# Patient Record
Sex: FEMALE | Race: WHITE | Employment: OTHER | URBAN - METROPOLITAN AREA
[De-identification: names, ages, dates, MRNs, and addresses within clinical notes are randomized per-mention and may not be internally consistent; named-entity substitution may affect disease eponyms.]

---

## 2017-02-21 PROCEDURE — 87624 HPV HI-RISK TYP POOLED RSLT: CPT | Performed by: OBSTETRICS & GYNECOLOGY

## 2017-02-21 PROCEDURE — 88175 CYTOPATH C/V AUTO FLUID REDO: CPT | Performed by: OBSTETRICS & GYNECOLOGY

## 2017-02-26 ENCOUNTER — HOSPITAL ENCOUNTER (EMERGENCY)
Facility: HOSPITAL | Age: 47
Discharge: HOME | End: 2017-02-26
Attending: EMERGENCY MEDICINE
Payer: COMMERCIAL

## 2017-02-26 VITALS
RESPIRATION RATE: 18 BRPM | HEART RATE: 98 BPM | TEMPERATURE: 97 F | OXYGEN SATURATION: 97 % | SYSTOLIC BLOOD PRESSURE: 100 MMHG | WEIGHT: 180 LBS | BODY MASS INDEX: 28.93 KG/M2 | HEIGHT: 66 IN | DIASTOLIC BLOOD PRESSURE: 69 MMHG

## 2017-02-26 DIAGNOSIS — N39.0 URINARY TRACT INFECTION, SITE UNSPECIFIED: ICD-10-CM

## 2017-02-26 DIAGNOSIS — R25.1 EPISODE OF SHAKING: Primary | ICD-10-CM

## 2017-02-26 LAB
ALBUMIN SERPL-MCNC: 4.2 G/DL (ref 3.5–4.8)
ALP LIVER SERPL-CCNC: 81 U/L (ref 37–98)
ALT SERPL-CCNC: 36 U/L (ref 14–54)
AST SERPL-CCNC: 17 U/L (ref 15–41)
ATRIAL RATE: 73 BPM
BASOPHILS # BLD AUTO: 0.02 X10(3) UL (ref 0–0.1)
BASOPHILS NFR BLD AUTO: 0.2 %
BILIRUB SERPL-MCNC: 0.5 MG/DL (ref 0.1–2)
BILIRUB UR QL STRIP.AUTO: NEGATIVE
BUN BLD-MCNC: 13 MG/DL (ref 8–20)
CALCIUM BLD-MCNC: 9.4 MG/DL (ref 8.3–10.3)
CHLORIDE: 112 MMOL/L (ref 101–111)
CO2: 22 MMOL/L (ref 22–32)
COLOR UR AUTO: YELLOW
CREAT BLD-MCNC: 1.18 MG/DL (ref 0.55–1.02)
EOSINOPHIL # BLD AUTO: 0.2 X10(3) UL (ref 0–0.3)
EOSINOPHIL NFR BLD AUTO: 2.4 %
ERYTHROCYTE [DISTWIDTH] IN BLOOD BY AUTOMATED COUNT: 13.2 % (ref 11.5–16)
GLUCOSE BLD-MCNC: 107 MG/DL (ref 70–99)
GLUCOSE UR STRIP.AUTO-MCNC: NEGATIVE MG/DL
HAV IGM SER QL: 2.1 MG/DL (ref 1.7–3)
HCT VFR BLD AUTO: 42.2 % (ref 34–50)
HGB BLD-MCNC: 14 G/DL (ref 12–16)
HYALINE CASTS #/AREA URNS AUTO: PRESENT /LPF
IMMATURE GRANULOCYTE COUNT: 0.03 X10(3) UL (ref 0–1)
IMMATURE GRANULOCYTE RATIO %: 0.4 %
KETONES UR STRIP.AUTO-MCNC: NEGATIVE MG/DL
LYMPHOCYTES # BLD AUTO: 3.09 X10(3) UL (ref 0.9–4)
LYMPHOCYTES NFR BLD AUTO: 37.1 %
M PROTEIN MFR SERPL ELPH: 7.9 G/DL (ref 6.1–8.3)
MCH RBC QN AUTO: 29.9 PG (ref 27–33.2)
MCHC RBC AUTO-ENTMCNC: 33.2 G/DL (ref 31–37)
MCV RBC AUTO: 90.2 FL (ref 81–100)
MONOCYTES # BLD AUTO: 0.52 X10(3) UL (ref 0.1–0.6)
MONOCYTES NFR BLD AUTO: 6.3 %
NEUTROPHIL ABS PRELIM: 4.46 X10 (3) UL (ref 1.3–6.7)
NEUTROPHILS # BLD AUTO: 4.46 X10(3) UL (ref 1.3–6.7)
NEUTROPHILS NFR BLD AUTO: 53.6 %
NITRITE UR QL STRIP.AUTO: NEGATIVE
P AXIS: 29 DEGREES
P-R INTERVAL: 160 MS
PH UR STRIP.AUTO: 6 [PH] (ref 4.5–8)
PLATELET # BLD AUTO: 256 10(3)UL (ref 150–450)
POTASSIUM SERPL-SCNC: 3.9 MMOL/L (ref 3.6–5.1)
PROT UR STRIP.AUTO-MCNC: NEGATIVE MG/DL
Q-T INTERVAL: 348 MS
QRS DURATION: 82 MS
QTC CALCULATION (BEZET): 383 MS
R AXIS: 10 DEGREES
RBC # BLD AUTO: 4.68 X10(6)UL (ref 3.8–5.1)
RBC UR QL AUTO: NEGATIVE
RED CELL DISTRIBUTION WIDTH-SD: 43.1 FL (ref 35.1–46.3)
SODIUM SERPL-SCNC: 144 MMOL/L (ref 136–144)
SP GR UR STRIP.AUTO: 1.01 (ref 1–1.03)
T AXIS: 35 DEGREES
UROBILINOGEN UR STRIP.AUTO-MCNC: <2 MG/DL
VENTRICULAR RATE: 73 BPM
WBC # BLD AUTO: 8.3 X10(3) UL (ref 4–13)

## 2017-02-26 PROCEDURE — 96374 THER/PROPH/DIAG INJ IV PUSH: CPT

## 2017-02-26 PROCEDURE — 93010 ELECTROCARDIOGRAM REPORT: CPT

## 2017-02-26 PROCEDURE — 87077 CULTURE AEROBIC IDENTIFY: CPT | Performed by: EMERGENCY MEDICINE

## 2017-02-26 PROCEDURE — 83735 ASSAY OF MAGNESIUM: CPT | Performed by: EMERGENCY MEDICINE

## 2017-02-26 PROCEDURE — 96375 TX/PRO/DX INJ NEW DRUG ADDON: CPT

## 2017-02-26 PROCEDURE — 93005 ELECTROCARDIOGRAM TRACING: CPT

## 2017-02-26 PROCEDURE — 99284 EMERGENCY DEPT VISIT MOD MDM: CPT

## 2017-02-26 PROCEDURE — 81001 URINALYSIS AUTO W/SCOPE: CPT | Performed by: EMERGENCY MEDICINE

## 2017-02-26 PROCEDURE — 99285 EMERGENCY DEPT VISIT HI MDM: CPT

## 2017-02-26 PROCEDURE — 80053 COMPREHEN METABOLIC PANEL: CPT | Performed by: EMERGENCY MEDICINE

## 2017-02-26 PROCEDURE — 85025 COMPLETE CBC W/AUTO DIFF WBC: CPT | Performed by: EMERGENCY MEDICINE

## 2017-02-26 PROCEDURE — 87086 URINE CULTURE/COLONY COUNT: CPT | Performed by: EMERGENCY MEDICINE

## 2017-02-26 RX ORDER — METOCLOPRAMIDE HYDROCHLORIDE 5 MG/ML
10 INJECTION INTRAMUSCULAR; INTRAVENOUS ONCE
Status: COMPLETED | OUTPATIENT
Start: 2017-02-26 | End: 2017-02-26

## 2017-02-26 RX ORDER — CIPROFLOXACIN 500 MG/1
500 TABLET, FILM COATED ORAL 2 TIMES DAILY
Qty: 6 TABLET | Refills: 0 | Status: SHIPPED | OUTPATIENT
Start: 2017-02-26 | End: 2017-03-01

## 2017-02-26 RX ORDER — IBUPROFEN 600 MG/1
600 TABLET ORAL ONCE
Status: COMPLETED | OUTPATIENT
Start: 2017-02-26 | End: 2017-02-26

## 2017-02-26 RX ORDER — LORAZEPAM 2 MG/ML
1 INJECTION INTRAMUSCULAR ONCE
Status: COMPLETED | OUTPATIENT
Start: 2017-02-26 | End: 2017-02-26

## 2017-02-26 RX ORDER — LORAZEPAM 2 MG/ML
1 INJECTION INTRAMUSCULAR ONCE
Status: DISCONTINUED | OUTPATIENT
Start: 2017-02-26 | End: 2017-02-26

## 2017-02-26 RX ORDER — DIPHENHYDRAMINE HYDROCHLORIDE 50 MG/ML
25 INJECTION INTRAMUSCULAR; INTRAVENOUS ONCE
Status: COMPLETED | OUTPATIENT
Start: 2017-02-26 | End: 2017-02-26

## 2017-02-26 NOTE — ED NOTES
Pt states headache is better. Resting comfortably on cart. Discharge instructions reviewed with pt and spouse. Prescription for antibiotic given to pt's . IV removed. No questions at time of discharge.

## 2017-02-26 NOTE — ED PROVIDER NOTES
Patient Seen in: [facility] Emergency Department    History   Patient presents with:  Seizure Disorder (neurologic)  Dyspnea JOSSELYN SOB (respiratory)    Stated Complaint: non-epileptic seizures and shortness of breath since this morning    HPI    Patie 5/26/2015    Comment Procedure: LITHOTRIPSY WITH CYSTOSCOPY, STENT PLACEMENT;  Surgeon: Lauro Martinez DO;  Location: [address] [place] [address]    [facility]    Comment Procedure: Uzmann Card;  Surgeon: Lauro Martinez DO (1080 MG) Oral Tab CR,     OMEPRAZOLE 40 MG Oral Capsule Delayed Release,  TAKE ONE CAPSULE BY MOUTH EVERY DAY   DULoxetine HCl (CYMBALTA) 60 MG Oral Cap DR Particles,  Take 1 capsule (60 mg total) by mouth daily.    ClonazePAM 1 MG Oral Tab,  Take 1 tablet HPI.  Constitutional and vital signs reviewed. All other systems reviewed and negative except as noted above. PSFH elements reviewed from today and agreed except as otherwise stated in HPI.     Physical Exam       ED Triage Vitals   BP 02/26/17 1010 REFLEX - Abnormal; Notable for the following:     Clarity Urine Hazy (*)     Leukocyte Esterase Urine Large (*)     WBC Urine 5-10 (*)     RBC URINE 3-5 (*)     Bacteria Urine Rare (*)     Squamous Epi.  Cells Large (*)     Hyaline Casts Present (*)     Muc physical exam at this time, I do not feel that admission or further ED testing is needed. Patient and family verbalized understanding and are comfortable with the plan as noted. Patient was subsequently discharged without incident.     Disposition and P

## 2017-02-26 NOTE — ED NOTES
Pt c/o HA. md aware. MD request urine sample. Pt notified. Pt request to walk to restroom. Morrow steady. Walking with nurse.

## 2017-03-01 PROBLEM — F44.5 PSYCHOGENIC NONEPILEPTIC SEIZURE: Status: ACTIVE | Noted: 2017-03-01

## 2017-03-07 PROCEDURE — 88305 TISSUE EXAM BY PATHOLOGIST: CPT | Performed by: OBSTETRICS & GYNECOLOGY

## 2017-03-16 PROBLEM — M62.89 PELVIC FLOOR DYSFUNCTION: Status: ACTIVE | Noted: 2017-03-16

## 2017-03-16 PROBLEM — N94.10 DYSPAREUNIA IN FEMALE: Status: ACTIVE | Noted: 2017-03-16

## 2017-03-16 PROBLEM — N39.3 STRESS INCONTINENCE: Status: ACTIVE | Noted: 2017-03-16

## 2017-03-16 PROBLEM — N39.0 RECURRENT UTI: Status: ACTIVE | Noted: 2017-03-16

## 2017-03-21 ENCOUNTER — ANESTHESIA EVENT (OUTPATIENT)
Dept: SURGERY | Facility: HOSPITAL | Age: 47
End: 2017-03-21
Payer: COMMERCIAL

## 2017-04-04 ENCOUNTER — ANESTHESIA (OUTPATIENT)
Dept: SURGERY | Facility: HOSPITAL | Age: 47
End: 2017-04-04
Payer: COMMERCIAL

## 2017-04-04 ENCOUNTER — SURGERY (OUTPATIENT)
Age: 47
End: 2017-04-04

## 2017-04-04 ENCOUNTER — HOSPITAL ENCOUNTER (OUTPATIENT)
Facility: HOSPITAL | Age: 47
Setting detail: OBSERVATION
Discharge: HOME | End: 2017-04-05
Attending: OBSTETRICS & GYNECOLOGY | Admitting: OBSTETRICS & GYNECOLOGY
Payer: COMMERCIAL

## 2017-04-04 PROBLEM — Z98.890 POST-OPERATIVE STATE: Status: ACTIVE | Noted: 2017-04-04

## 2017-04-04 LAB
ANTIBODY SCREEN: NEGATIVE
HGB BLD-MCNC: 13.5 G/DL (ref 12–16)
RH BLOOD TYPE: NEGATIVE

## 2017-04-04 PROCEDURE — 86850 RBC ANTIBODY SCREEN: CPT | Performed by: OBSTETRICS & GYNECOLOGY

## 2017-04-04 PROCEDURE — 88307 TISSUE EXAM BY PATHOLOGIST: CPT | Performed by: OBSTETRICS & GYNECOLOGY

## 2017-04-04 PROCEDURE — 8E0W4CZ ROBOTIC ASSISTED PROCEDURE OF TRUNK REGION, PERCUTANEOUS ENDOSCOPIC APPROACH: ICD-10-PCS | Performed by: OBSTETRICS & GYNECOLOGY

## 2017-04-04 PROCEDURE — 0TN64ZZ RELEASE RIGHT URETER, PERCUTANEOUS ENDOSCOPIC APPROACH: ICD-10-PCS | Performed by: OBSTETRICS & GYNECOLOGY

## 2017-04-04 PROCEDURE — 0UTC4ZZ RESECTION OF CERVIX, PERCUTANEOUS ENDOSCOPIC APPROACH: ICD-10-PCS | Performed by: OBSTETRICS & GYNECOLOGY

## 2017-04-04 PROCEDURE — 0TN74ZZ RELEASE LEFT URETER, PERCUTANEOUS ENDOSCOPIC APPROACH: ICD-10-PCS | Performed by: OBSTETRICS & GYNECOLOGY

## 2017-04-04 PROCEDURE — 85018 HEMOGLOBIN: CPT | Performed by: ANESTHESIOLOGY

## 2017-04-04 PROCEDURE — 86900 BLOOD TYPING SEROLOGIC ABO: CPT | Performed by: OBSTETRICS & GYNECOLOGY

## 2017-04-04 PROCEDURE — 86901 BLOOD TYPING SEROLOGIC RH(D): CPT | Performed by: OBSTETRICS & GYNECOLOGY

## 2017-04-04 RX ORDER — TOPIRAMATE 100 MG/1
100 TABLET, FILM COATED ORAL 2 TIMES DAILY
Status: DISCONTINUED | OUTPATIENT
Start: 2017-04-04 | End: 2017-04-05

## 2017-04-04 RX ORDER — PRAVASTATIN SODIUM 20 MG
20 TABLET ORAL NIGHTLY
Status: DISCONTINUED | OUTPATIENT
Start: 2017-04-04 | End: 2017-04-05

## 2017-04-04 RX ORDER — KETOROLAC TROMETHAMINE 30 MG/ML
30 INJECTION, SOLUTION INTRAMUSCULAR; INTRAVENOUS EVERY 6 HOURS
Status: DISCONTINUED | OUTPATIENT
Start: 2017-04-04 | End: 2017-04-05

## 2017-04-04 RX ORDER — CLONAZEPAM 0.5 MG/1
0.5 TABLET ORAL DAILY PRN
Status: DISCONTINUED | OUTPATIENT
Start: 2017-04-04 | End: 2017-04-05

## 2017-04-04 RX ORDER — SODIUM CHLORIDE, SODIUM LACTATE, POTASSIUM CHLORIDE, CALCIUM CHLORIDE 600; 310; 30; 20 MG/100ML; MG/100ML; MG/100ML; MG/100ML
INJECTION, SOLUTION INTRAVENOUS CONTINUOUS
Status: DISCONTINUED | OUTPATIENT
Start: 2017-04-04 | End: 2017-04-05

## 2017-04-04 RX ORDER — NALOXONE HYDROCHLORIDE 0.4 MG/ML
80 INJECTION, SOLUTION INTRAMUSCULAR; INTRAVENOUS; SUBCUTANEOUS AS NEEDED
Status: DISCONTINUED | OUTPATIENT
Start: 2017-04-04 | End: 2017-04-04 | Stop reason: HOSPADM

## 2017-04-04 RX ORDER — LEVOTHYROXINE SODIUM 0.05 MG/1
50 TABLET ORAL
COMMUNITY
End: 2017-10-17

## 2017-04-04 RX ORDER — HYDROMORPHONE HYDROCHLORIDE 1 MG/ML
0.4 INJECTION, SOLUTION INTRAMUSCULAR; INTRAVENOUS; SUBCUTANEOUS EVERY 5 MIN PRN
Status: DISCONTINUED | OUTPATIENT
Start: 2017-04-04 | End: 2017-04-04 | Stop reason: HOSPADM

## 2017-04-04 RX ORDER — TOPIRAMATE 100 MG/1
100 TABLET, FILM COATED ORAL 2 TIMES DAILY
COMMUNITY
End: 2017-06-20

## 2017-04-04 RX ORDER — HYDROCODONE BITARTRATE AND ACETAMINOPHEN 5; 325 MG/1; MG/1
2 TABLET ORAL EVERY 4 HOURS PRN
Status: DISCONTINUED | OUTPATIENT
Start: 2017-04-04 | End: 2017-04-05

## 2017-04-04 RX ORDER — ENOXAPARIN SODIUM 100 MG/ML
40 INJECTION SUBCUTANEOUS NIGHTLY
Status: DISCONTINUED | OUTPATIENT
Start: 2017-04-04 | End: 2017-04-05

## 2017-04-04 RX ORDER — HEPARIN SODIUM 5000 [USP'U]/ML
5000 INJECTION, SOLUTION INTRAVENOUS; SUBCUTANEOUS ONCE
Status: COMPLETED | OUTPATIENT
Start: 2017-04-04 | End: 2017-04-04

## 2017-04-04 RX ORDER — HYDROMORPHONE HYDROCHLORIDE 1 MG/ML
INJECTION, SOLUTION INTRAMUSCULAR; INTRAVENOUS; SUBCUTANEOUS
Status: COMPLETED
Start: 2017-04-04 | End: 2017-04-04

## 2017-04-04 RX ORDER — CLINDAMYCIN PHOSPHATE 900 MG/50ML
900 INJECTION INTRAVENOUS EVERY 8 HOURS
Status: COMPLETED | OUTPATIENT
Start: 2017-04-04 | End: 2017-04-05

## 2017-04-04 RX ORDER — MEPERIDINE HYDROCHLORIDE 25 MG/ML
12.5 INJECTION INTRAMUSCULAR; INTRAVENOUS; SUBCUTANEOUS AS NEEDED
Status: DISCONTINUED | OUTPATIENT
Start: 2017-04-04 | End: 2017-04-04 | Stop reason: HOSPADM

## 2017-04-04 RX ORDER — PROCHLORPERAZINE 25 MG
25 SUPPOSITORY, RECTAL RECTAL EVERY 12 HOURS PRN
Status: DISCONTINUED | OUTPATIENT
Start: 2017-04-04 | End: 2017-04-05

## 2017-04-04 RX ORDER — HYDROCODONE BITARTRATE AND ACETAMINOPHEN 5; 325 MG/1; MG/1
1 TABLET ORAL EVERY 4 HOURS PRN
Status: DISCONTINUED | OUTPATIENT
Start: 2017-04-04 | End: 2017-04-05

## 2017-04-04 RX ORDER — ONDANSETRON 4 MG/1
4 TABLET, FILM COATED ORAL EVERY 8 HOURS PRN
Status: DISCONTINUED | OUTPATIENT
Start: 2017-04-04 | End: 2017-04-05

## 2017-04-04 RX ORDER — ACETAMINOPHEN 325 MG/1
650 TABLET ORAL EVERY 4 HOURS PRN
Status: DISCONTINUED | OUTPATIENT
Start: 2017-04-04 | End: 2017-04-05

## 2017-04-04 RX ORDER — CLINDAMYCIN PHOSPHATE 900 MG/50ML
INJECTION INTRAVENOUS
Status: DISCONTINUED | OUTPATIENT
Start: 2017-04-04 | End: 2017-04-04

## 2017-04-04 RX ORDER — PROCHLORPERAZINE MALEATE 10 MG
10 TABLET ORAL EVERY 12 HOURS PRN
Status: DISCONTINUED | OUTPATIENT
Start: 2017-04-04 | End: 2017-04-05

## 2017-04-04 RX ORDER — GENTAMICIN SULFATE 60 MG/50ML
INJECTION, SOLUTION INTRAVENOUS
Status: DISCONTINUED | OUTPATIENT
Start: 2017-04-04 | End: 2017-04-04

## 2017-04-04 RX ORDER — LEVOTHYROXINE SODIUM 0.05 MG/1
50 TABLET ORAL
Status: DISCONTINUED | OUTPATIENT
Start: 2017-04-05 | End: 2017-04-05

## 2017-04-04 RX ORDER — ONDANSETRON 2 MG/ML
4 INJECTION INTRAMUSCULAR; INTRAVENOUS EVERY 8 HOURS PRN
Status: DISCONTINUED | OUTPATIENT
Start: 2017-04-04 | End: 2017-04-05

## 2017-04-04 RX ORDER — PRAVASTATIN SODIUM 20 MG
20 TABLET ORAL NIGHTLY
COMMUNITY
End: 2017-06-30

## 2017-04-04 RX ORDER — LORAZEPAM 2 MG/ML
0.5 INJECTION INTRAMUSCULAR EVERY 6 HOURS PRN
Status: DISCONTINUED | OUTPATIENT
Start: 2017-04-04 | End: 2017-04-05

## 2017-04-04 RX ORDER — BUPIVACAINE HYDROCHLORIDE AND EPINEPHRINE 5; 5 MG/ML; UG/ML
INJECTION, SOLUTION EPIDURAL; INTRACAUDAL; PERINEURAL AS NEEDED
Status: DISCONTINUED | OUTPATIENT
Start: 2017-04-04 | End: 2017-04-04 | Stop reason: HOSPADM

## 2017-04-04 RX ORDER — MIDAZOLAM HYDROCHLORIDE 1 MG/ML
1 INJECTION INTRAMUSCULAR; INTRAVENOUS EVERY 5 MIN PRN
Status: DISCONTINUED | OUTPATIENT
Start: 2017-04-04 | End: 2017-04-04 | Stop reason: HOSPADM

## 2017-04-04 RX ORDER — OMEPRAZOLE 40 MG/1
40 CAPSULE, DELAYED RELEASE ORAL NIGHTLY
COMMUNITY
End: 2017-10-17

## 2017-04-04 RX ORDER — ZOLPIDEM TARTRATE 12.5 MG/1
12.5 TABLET, FILM COATED, EXTENDED RELEASE ORAL NIGHTLY PRN
COMMUNITY
End: 2017-11-10

## 2017-04-04 RX ORDER — DIPHENHYDRAMINE HYDROCHLORIDE 50 MG/ML
12.5 INJECTION INTRAMUSCULAR; INTRAVENOUS EVERY 4 HOURS PRN
Status: DISCONTINUED | OUTPATIENT
Start: 2017-04-04 | End: 2017-04-05

## 2017-04-04 RX ORDER — ZOLPIDEM TARTRATE 5 MG/1
5 TABLET ORAL NIGHTLY PRN
Status: DISCONTINUED | OUTPATIENT
Start: 2017-04-04 | End: 2017-04-05

## 2017-04-04 RX ORDER — METOCLOPRAMIDE HYDROCHLORIDE 5 MG/ML
10 INJECTION INTRAMUSCULAR; INTRAVENOUS AS NEEDED
Status: DISCONTINUED | OUTPATIENT
Start: 2017-04-04 | End: 2017-04-04 | Stop reason: HOSPADM

## 2017-04-04 RX ORDER — MORPHINE SULFATE 2 MG/ML
1 INJECTION, SOLUTION INTRAMUSCULAR; INTRAVENOUS EVERY 2 HOUR PRN
Status: DISCONTINUED | OUTPATIENT
Start: 2017-04-04 | End: 2017-04-05

## 2017-04-04 RX ORDER — CLINDAMYCIN PHOSPHATE 900 MG/50ML
900 INJECTION INTRAVENOUS ONCE
Status: DISCONTINUED | OUTPATIENT
Start: 2017-04-04 | End: 2017-04-04 | Stop reason: HOSPADM

## 2017-04-04 RX ORDER — MORPHINE SULFATE 2 MG/ML
2 INJECTION, SOLUTION INTRAMUSCULAR; INTRAVENOUS EVERY 2 HOUR PRN
Status: DISCONTINUED | OUTPATIENT
Start: 2017-04-04 | End: 2017-04-05

## 2017-04-04 RX ORDER — CLONAZEPAM 1 MG/1
1 TABLET ORAL 2 TIMES DAILY
Status: DISCONTINUED | OUTPATIENT
Start: 2017-04-04 | End: 2017-04-05

## 2017-04-04 RX ORDER — LORAZEPAM 2 MG/ML
1 INJECTION INTRAMUSCULAR EVERY 6 HOURS PRN
Status: DISCONTINUED | OUTPATIENT
Start: 2017-04-04 | End: 2017-04-05

## 2017-04-04 RX ORDER — DULOXETIN HYDROCHLORIDE 30 MG/1
60 CAPSULE, DELAYED RELEASE ORAL DAILY
Status: DISCONTINUED | OUTPATIENT
Start: 2017-04-05 | End: 2017-04-05

## 2017-04-04 RX ORDER — ONDANSETRON 2 MG/ML
4 INJECTION INTRAMUSCULAR; INTRAVENOUS AS NEEDED
Status: DISCONTINUED | OUTPATIENT
Start: 2017-04-04 | End: 2017-04-04 | Stop reason: HOSPADM

## 2017-04-04 RX ORDER — MORPHINE SULFATE 4 MG/ML
4 INJECTION, SOLUTION INTRAMUSCULAR; INTRAVENOUS EVERY 2 HOUR PRN
Status: DISCONTINUED | OUTPATIENT
Start: 2017-04-04 | End: 2017-04-05

## 2017-04-04 RX ORDER — DEXTROSE, SODIUM CHLORIDE, AND POTASSIUM CHLORIDE 5; .9; .15 G/100ML; G/100ML; G/100ML
INJECTION INTRAVENOUS CONTINUOUS
Status: DISCONTINUED | OUTPATIENT
Start: 2017-04-04 | End: 2017-04-05

## 2017-04-04 RX ORDER — DEXAMETHASONE SODIUM PHOSPHATE 4 MG/ML
4 VIAL (ML) INJECTION AS NEEDED
Status: DISCONTINUED | OUTPATIENT
Start: 2017-04-04 | End: 2017-04-04 | Stop reason: HOSPADM

## 2017-04-04 RX ORDER — CLINDAMYCIN PHOSPHATE 900 MG/50ML
INJECTION INTRAVENOUS
Status: DISPENSED
Start: 2017-04-04 | End: 2017-04-04

## 2017-04-04 NOTE — OPERATIVE REPORT
[facility]    PATIENT'S NAME: Yg Riddle   ATTENDING PHYSICIAN: Libra Vyas M.D. OPERATING PHYSICIAN: Libra Vyas M.D.    PATIENT ACCOUNT#:   [de-identified]    LOCATION:  [address] [address] [facility]  MEDICAL RECORD #:   [id number]       DATE Lazarus Heys performed of the retroperitoneum. The ureter was exposed and was densely adhesed to the middle structure. Then, dissection of the ureter was continued. During this dissection, uterine vessels were clamped and ligated going over the ureter.   That helped

## 2017-04-04 NOTE — PROGRESS NOTES
[facility]  Brief Op Note    [facility]   [facility] [id number] MRN [id number]   Admission Date 4/4/2017 Operation Date 4/4/2017   Attending Physician Kandace Bobo MD Operating Physician Reza Olvera MD     Pre-Operative Diag

## 2017-04-04 NOTE — PROGRESS NOTES
NURSING ADMISSION NOTE      Patient admitted via Cart  Oriented to room. Safety precautions initiated. Bed in low position. Call light in reach. RECEIVED PT FROM PACU , ALERT AND ORIENT X 3 , VERY DROWSY , EASILY AROUSABLE , VITALS STABLE .  JANICE PALOMO

## 2017-04-04 NOTE — PROGRESS NOTES
04/04/17 1743   Clinical Encounter Type   Visited With Patient and family together  ( at bedside)   Routine Visit Introduction   Continue Visiting No   Patient's Supportive Strategies/Resources  provided emotional support, including activ

## 2017-04-04 NOTE — ANESTHESIA PREPROCEDURE EVALUATION
PRE-OP EVALUATION    Patient Name: Marita Hall    Pre-op Diagnosis: ABNORMAL PAP SMEAR, CERVICAL DYSPLASIA, DYSPAREUNIA    Procedure(s):  ROBOTIC ASSISTED LAPAROSCOPIC TOTAL CERVICECTOMY, POSSIBLE LAPAROTOMY    Surgeon(s) and Role:     Chelsea Ragland, tablet Rfl: 2       Allergies: Cashews; Penicillin G; Penicillins; Sulfa Antibiotics      Anesthesia Evaluation    Patient summary reviewed.     Anesthetic Complications  (+) history of anesthetic complications  History of: PONV       GI/Hepatic/Renal LEEP  10/14/15    Comment DDL/HIGH GRADE CERVICAL DYSPLASIA    LEEP  11/4/15    Comment fu dr Mae Connell after leep    INJECTION, ANESTHETIC/STEROID, TRANSFORAMINAL EPIDURAL; LUMBAR/SACRAL, SINGLE LEVEL N/A 2/24/2016    Comment Procedure: Jennie Gonzalez / Gaurang Owens awareness.     Plan/risks discussed with: patient                Present on Admission:   **None**

## 2017-04-04 NOTE — ANESTHESIA POSTPROCEDURE EVALUATION
[facility] [place] Status:  Outpatient in a Bed   Age/Gender 40year old female MRN [id number]   Location [address] Attending Lakia Oshea MD   [facility] Day # 0 PCP Misty Lilly DO       A

## 2017-04-05 VITALS
BODY MASS INDEX: 30.56 KG/M2 | SYSTOLIC BLOOD PRESSURE: 100 MMHG | RESPIRATION RATE: 183 BRPM | HEIGHT: 64 IN | WEIGHT: 179 LBS | HEART RATE: 81 BPM | DIASTOLIC BLOOD PRESSURE: 57 MMHG | OXYGEN SATURATION: 95 % | TEMPERATURE: 98 F

## 2017-04-05 LAB
ERYTHROCYTE [DISTWIDTH] IN BLOOD BY AUTOMATED COUNT: 13 % (ref 11.5–16)
HCT VFR BLD AUTO: 32 % (ref 34–50)
HGB BLD-MCNC: 10.2 G/DL (ref 12–16)
MCH RBC QN AUTO: 29.7 PG (ref 27–33.2)
MCHC RBC AUTO-ENTMCNC: 31.9 G/DL (ref 31–37)
MCV RBC AUTO: 93.3 FL (ref 81–100)
PLATELET # BLD AUTO: 177 10(3)UL (ref 150–450)
RBC # BLD AUTO: 3.43 X10(6)UL (ref 3.8–5.1)
RED CELL DISTRIBUTION WIDTH-SD: 44.3 FL (ref 35.1–46.3)
WBC # BLD AUTO: 9.3 X10(3) UL (ref 4–13)

## 2017-04-05 PROCEDURE — 85027 COMPLETE CBC AUTOMATED: CPT | Performed by: OBSTETRICS & GYNECOLOGY

## 2017-04-05 RX ORDER — HYDROCODONE BITARTRATE AND ACETAMINOPHEN 5; 325 MG/1; MG/1
1 TABLET ORAL EVERY 8 HOURS PRN
Qty: 30 TABLET | Refills: 0 | Status: SHIPPED | OUTPATIENT
Start: 2017-04-05 | End: 2017-04-05

## 2017-04-05 NOTE — PROGRESS NOTES
0116: RN called into patients room. Patient Bp noted to be 85/54. Patient asymptomatic. Denies any lightheadedness or dizziness. Requesting pain medication. Urine output adequate. Patient states \"I feel a seizure coming on\".  Tremors noted to bilateral ar

## 2017-04-05 NOTE — PROGRESS NOTES
NURSING DISCHARGE NOTE    Discharged Home via Wheelchair. Accompanied by Family member and Support staff  Belongings Taken by patient/family DISCUSSED D/C INSTRUCTIONS WITH PT AND FAMILY . ANSWERED ALL QUESTIONS . VERBALIZED UNDERSTANDING .

## 2017-04-05 NOTE — PAYOR COMM NOTE
Attending Physician: Lakia Oshea MD    Review Type: ADMISSION   Reviewer: Karlee Syed       Date: April 5, 2017 - 8:37 AM  Payor: [facility]   Authorization Number: [id number]  Admit date: 4/4/2017  8:04 AM       Direct for OR    POSTOPERATIVE Emmett Dos Santos Mary Bourgeois RN    4/4/2017 1239 Given 0.4 mg Intravenous Mary Bourgeois RN    4/4/2017 1223 Given 0.4 mg Intravenous Mary Bourgeois RN      dextrose 5 % and 0.9 % NaCl with KCl 20 mEq infusion     Date Action Dose Route User    4/4/2017 1429 Narrative: The following orders were created for panel order TYPE AND SCREEN.   Procedure                               Abnormality         Status                     ---------                               -----------         ------

## 2017-04-05 NOTE — PROGRESS NOTES
PT HAD  GEN TREMORS , NOTIFIED MD AND RECEIVED NEW ORDER , UPDATED WITH PT AND FAMILY .  WILL CONTINUE TO MONITOR

## 2017-04-05 NOTE — PROGRESS NOTES
[facility]  Progress Note    Osorio Christensen Patient Status:  Observation    1970 MRN [id number]   [facility] 3NW-A Attending Zoraida Stewart MD   [facility] Day # 1 PCP Dylan Connors DO       SUBJECTIVE:  Comfortable, mini (NORCO) 5-325 MG per tab 1 tablet 1 tablet Oral Q4H PRN   Or      HYDROcodone-acetaminophen (NORCO) 5-325 MG per tab 2 tablet 2 tablet Oral Q4H PRN   morphINE sulfate (PF) 2 MG/ML injection 1 mg 1 mg Intravenous Q2H PRN   Or      morphINE sulfate (PF) 2 MG

## 2017-04-05 NOTE — PLAN OF CARE
PAIN - ADULT    • Verbalizes/displays adequate comfort level or patient's stated pain goal Adequate for Discharge        SKIN/TISSUE INTEGRITY - ADULT    • Skin integrity remains intact Adequate for Discharge    • Incision(s), wounds(s) or drain site(s) he

## 2017-04-28 ENCOUNTER — APPOINTMENT (OUTPATIENT)
Dept: CT IMAGING | Facility: HOSPITAL | Age: 47
End: 2017-04-28
Attending: EMERGENCY MEDICINE
Payer: COMMERCIAL

## 2017-04-28 ENCOUNTER — APPOINTMENT (OUTPATIENT)
Dept: GENERAL RADIOLOGY | Facility: HOSPITAL | Age: 47
End: 2017-04-28
Attending: EMERGENCY MEDICINE
Payer: COMMERCIAL

## 2017-04-28 ENCOUNTER — HOSPITAL ENCOUNTER (OUTPATIENT)
Facility: HOSPITAL | Age: 47
Setting detail: OBSERVATION
Discharge: HOME | End: 2017-05-01
Attending: EMERGENCY MEDICINE | Admitting: HOSPITALIST
Payer: COMMERCIAL

## 2017-04-28 DIAGNOSIS — E87.6 HYPOKALEMIA: ICD-10-CM

## 2017-04-28 DIAGNOSIS — R55 SYNCOPE AND COLLAPSE: Primary | ICD-10-CM

## 2017-04-28 PROBLEM — Z91.81 AT RISK FOR FALLING: Status: ACTIVE | Noted: 2017-04-28

## 2017-04-28 PROCEDURE — 71020 XR CHEST PA + LAT CHEST (CPT=71020): CPT

## 2017-04-28 PROCEDURE — 71275 CT ANGIOGRAPHY CHEST: CPT

## 2017-04-28 RX ORDER — TOPIRAMATE 100 MG/1
100 TABLET, FILM COATED ORAL 2 TIMES DAILY
Status: DISCONTINUED | OUTPATIENT
Start: 2017-04-28 | End: 2017-05-01

## 2017-04-28 RX ORDER — LORAZEPAM 1 MG/1
1 TABLET ORAL 2 TIMES DAILY
Refills: 1 | COMMUNITY
Start: 2017-04-05 | End: 2017-06-07 | Stop reason: ALTCHOICE

## 2017-04-28 RX ORDER — HYDROCODONE BITARTRATE AND ACETAMINOPHEN 5; 325 MG/1; MG/1
1 TABLET ORAL ONCE
Status: DISCONTINUED | OUTPATIENT
Start: 2017-04-28 | End: 2017-04-28

## 2017-04-28 RX ORDER — LEVOTHYROXINE SODIUM 0.05 MG/1
50 TABLET ORAL
Status: DISCONTINUED | OUTPATIENT
Start: 2017-04-29 | End: 2017-05-01

## 2017-04-28 RX ORDER — ONDANSETRON 2 MG/ML
4 INJECTION INTRAMUSCULAR; INTRAVENOUS EVERY 6 HOURS PRN
Status: DISCONTINUED | OUTPATIENT
Start: 2017-04-28 | End: 2017-05-01

## 2017-04-28 RX ORDER — HYDROCODONE BITARTRATE AND ACETAMINOPHEN 5; 325 MG/1; MG/1
1 TABLET ORAL ONCE
Status: COMPLETED | OUTPATIENT
Start: 2017-04-28 | End: 2017-04-28

## 2017-04-28 RX ORDER — PRAVASTATIN SODIUM 20 MG
20 TABLET ORAL DAILY
Status: DISCONTINUED | OUTPATIENT
Start: 2017-04-29 | End: 2017-05-01

## 2017-04-28 RX ORDER — ZOLPIDEM TARTRATE 10 MG/1
10 TABLET ORAL NIGHTLY PRN
Status: DISCONTINUED | OUTPATIENT
Start: 2017-04-28 | End: 2017-05-01

## 2017-04-28 RX ORDER — LORAZEPAM 2 MG/ML
1 INJECTION INTRAMUSCULAR ONCE
Status: COMPLETED | OUTPATIENT
Start: 2017-04-28 | End: 2017-04-28

## 2017-04-28 RX ORDER — CLONAZEPAM 0.5 MG/1
1 TABLET ORAL 2 TIMES DAILY
Status: DISCONTINUED | OUTPATIENT
Start: 2017-04-28 | End: 2017-05-01

## 2017-04-28 RX ORDER — PANTOPRAZOLE SODIUM 40 MG/1
40 TABLET, DELAYED RELEASE ORAL
Status: DISCONTINUED | OUTPATIENT
Start: 2017-04-29 | End: 2017-05-01

## 2017-04-28 RX ORDER — ACETAMINOPHEN 325 MG/1
650 TABLET ORAL EVERY 6 HOURS PRN
Status: DISCONTINUED | OUTPATIENT
Start: 2017-04-28 | End: 2017-05-01

## 2017-04-28 RX ORDER — HYDROCODONE BITARTRATE AND ACETAMINOPHEN 5; 325 MG/1; MG/1
1 TABLET ORAL EVERY 6 HOURS PRN
Status: DISCONTINUED | OUTPATIENT
Start: 2017-04-28 | End: 2017-04-29

## 2017-04-28 RX ORDER — DULOXETIN HYDROCHLORIDE 30 MG/1
60 CAPSULE, DELAYED RELEASE ORAL DAILY
Status: DISCONTINUED | OUTPATIENT
Start: 2017-04-29 | End: 2017-05-01

## 2017-04-28 NOTE — ED NOTES
Patient had a second episode of shaking while in room. Patient became tense, slowly started shaking to her entire body,  reports she is going to have another seizure.   When patient asked if she thought she was going to have a seizure she stated y

## 2017-04-28 NOTE — ED INITIAL ASSESSMENT (HPI)
Patient to ED via EMS. Reports the patient was going to get an outpatient mri, had an episode that lasted about 20 minutes where she was having a seizure off and on. 0.5mg Klonopin was administered sublingual at that time.   No known injury or trauma, re

## 2017-04-28 NOTE — ED PROVIDER NOTES
Patient Seen in: [facility] Emergency Department    History   Patient presents with:  Seizure Disorder (neurologic)    Stated Complaint: seizure    HPI    81-year-old white female who presents to the emergency room today for complaint of syncopal ep unspecified hyperlipidemia    • Hypercholesterolemia    • Kidney disease    • Migraines    • Depression    • Psychogenic nonepileptic seizure    • Kidney stones    • Visual impairment      glasses for reading   • Calculus of kidney    • PONV (postoperative STEROID INJECTION;  Surgeon: Sherlyn Nieto DO;  Location: [address] [place] [address]    INJECTION, ANESTHETIC/STEROID, TRANSFORAMINAL EPIDURAL; LUMBAR/SACRAL, ADD'L LEVEL N/A 2/24/2016    Comment Procedure: LUMBAR / TRANSFORAMINAL EPIDURAL STEROID Raul Stern Other[Other] [OTHER] Paternal Grandfather 79     brain aneurysm   • Breast Cancer Maternal Aunt      ?    • kidney Cancer [Other] [OTHER]       uncle          Smoking Status: Never Smoker                      Smokeless Status: Never Used flexion are 5 over 5 on the left the patient has some slight weakness with knee extension on the right but has good dorsi and plantar flexion. Patient reports the right leg weakness is chronic ever since this seizure disorder started.       Skin exam revea the individual orders. RAINBOW DRAW BLUE   RAINBOW DRAW GOLD   RAINBOW DRAW LAVENDER   RAINBOW DRAW LIGHT GREEN      EKG    Rate, intervals and axes as noted on EKG Report.   Rate: 90 bpm  Rhythm: Sinus Rhythm  Reading: Normal sinus rhythm with incomplete syncopal episodes outside of her \"seizures\" procedure seem to be more pseudoseizure type in nature. Patient will be admitted and worked up for the syncopal episodes at this time.   The [facility] hospitalist does agree to admit her and will continue the workup

## 2017-04-28 NOTE — ED NOTES
Late Entry:  Upon arrival to ER paramedics report they think the patient will have another seizure, she became tense, and then started shaking. ER MD notified and at bedside, patient able to answer questions.

## 2017-04-28 NOTE — ED NOTES
Patient is shaking upon this RN entering room. Arms and legs are shaking and is shaking back and forth Pt is grimacing and breathing heavily. Episode ended at 1523. Xray entered room while patient was still shaking.  It was determined between this RN, the x

## 2017-04-29 ENCOUNTER — APPOINTMENT (OUTPATIENT)
Dept: CV DIAGNOSTICS | Facility: HOSPITAL | Age: 47
End: 2017-04-29
Attending: HOSPITALIST
Payer: COMMERCIAL

## 2017-04-29 ENCOUNTER — APPOINTMENT (OUTPATIENT)
Dept: MRI IMAGING | Facility: HOSPITAL | Age: 47
End: 2017-04-29
Attending: Other
Payer: COMMERCIAL

## 2017-04-29 PROCEDURE — 93306 TTE W/DOPPLER COMPLETE: CPT | Performed by: INTERNAL MEDICINE

## 2017-04-29 PROCEDURE — 93306 TTE W/DOPPLER COMPLETE: CPT

## 2017-04-29 RX ORDER — LORAZEPAM 2 MG/ML
INJECTION INTRAMUSCULAR
Status: COMPLETED
Start: 2017-04-29 | End: 2017-04-29

## 2017-04-29 RX ORDER — CLONAZEPAM 0.5 MG/1
0.5 TABLET ORAL DAILY PRN
Status: DISCONTINUED | OUTPATIENT
Start: 2017-04-29 | End: 2017-05-01

## 2017-04-29 RX ORDER — LORAZEPAM 2 MG/ML
1 INJECTION INTRAMUSCULAR EVERY 2 HOUR PRN
Status: DISCONTINUED | OUTPATIENT
Start: 2017-04-29 | End: 2017-05-01

## 2017-04-29 RX ORDER — SODIUM CHLORIDE 9 MG/ML
INJECTION, SOLUTION INTRAVENOUS CONTINUOUS
Status: DISCONTINUED | OUTPATIENT
Start: 2017-04-29 | End: 2017-05-01

## 2017-04-29 RX ORDER — POTASSIUM CHLORIDE 20 MEQ/1
40 TABLET, EXTENDED RELEASE ORAL EVERY 4 HOURS
Status: COMPLETED | OUTPATIENT
Start: 2017-04-29 | End: 2017-04-29

## 2017-04-29 RX ORDER — LORAZEPAM 2 MG/ML
2 INJECTION INTRAMUSCULAR EVERY 2 HOUR PRN
Status: DISCONTINUED | OUTPATIENT
Start: 2017-04-29 | End: 2017-05-01

## 2017-04-29 RX ORDER — HYDROCODONE BITARTRATE AND ACETAMINOPHEN 5; 325 MG/1; MG/1
1 TABLET ORAL EVERY 6 HOURS PRN
Status: DISCONTINUED | OUTPATIENT
Start: 2017-04-29 | End: 2017-05-01

## 2017-04-29 RX ORDER — RUFINAMIDE 40 MG/ML
2 SUSPENSION ORAL DAILY
Status: DISCONTINUED | OUTPATIENT
Start: 2017-04-29 | End: 2017-05-01

## 2017-04-29 RX ORDER — LIDOCAINE 50 MG/G
1 PATCH TOPICAL EVERY 24 HOURS
Status: DISCONTINUED | OUTPATIENT
Start: 2017-04-29 | End: 2017-05-01

## 2017-04-29 RX ORDER — LORAZEPAM 2 MG/ML
1 INJECTION INTRAMUSCULAR EVERY 2 HOUR PRN
Status: DISCONTINUED | OUTPATIENT
Start: 2017-04-29 | End: 2017-04-29 | Stop reason: SDUPTHER

## 2017-04-29 RX ORDER — MELATONIN
325
Status: DISCONTINUED | OUTPATIENT
Start: 2017-04-29 | End: 2017-05-01

## 2017-04-29 NOTE — SLP NOTE
Followed up with the pt's RN Christiana this morning to check pt's status and appropriateness for BSSE.  Pt's RN stated that the pt has been having seizures this am, however at the time of this author's phone call it had been an hour and a half since the last she had 1 this morning that she does not recall.  She usually does not recall her events, but this one looked more seizure-like per staff with some changes in her vital signs, as well.  The patient is awake, alert, currently with no headache.  She says aissatou

## 2017-04-29 NOTE — CONSULTS
[address] [place]    PATIENT'S NAME: Alyssa Sandovallle   ATTENDING PHYSICIAN: LUPIS Ulloa PHYSICIAN: Nato Lee M.D.    PATIENT ACCOUNT#:   [de-identified]    LOCATION:  [address] [place] [address]  MEDICAL RECORD #:   [id number]       DATE OF well, which she has currently. PAST MEDICAL HISTORY:  Hypothyroidism, vitamin D deficiency, cervical dysplasia, endometriosis, migraine, hypercholesterolemia, nephrolithiasis, postop nausea, vomiting.      PAST SURGICAL HISTORY:  Left breast mass, rhinop at this time. Potentially have Cardiology see her. Orthostatics should be checked as her blood pressure is definitely on the low side. She is anemic, so we will check iron studies, Y54, folic acid, and follow the patient clinically.     Dictated By Jodi Green

## 2017-04-29 NOTE — PLAN OF CARE
Admit from ED. Oriented to the unit and the room. Admit nurse did navigator.  took home medications home. Patient given meds per [facility]. Patient asked for PRN norco, received order but patient fell asleep and did not need.   Patient up with 1 perso

## 2017-04-29 NOTE — PLAN OF CARE
Call light answered by PCT. Patient started a seizure-like upon her answering light. Called me. Seizure-like activity noted upon arrival. Activity started at 8:09a and lasted till 8:21a.

## 2017-04-29 NOTE — H&P
[facility] hospitalist H+P    PCP;DO DEVAUGHN STEPHENS possible seizure     HPI: 39 yo female with many medical problems including Hypothyroidism, migraine, psychogenic nonepileptic seizure presented for evaluation due to possible seizures.  Per patient sh Marianne Ovalle DO;  Location: [address] [place] [address]    CYSTOURETHROSCOPY Right 5/26/2015    Comment Procedure: Hector Bright;  Surgeon: Bertha Alfaro DO;  Location: [address] [place] [address]    COLONOSCOPY  8/11/15    Comment normal;ASC    UPPER GI END • kidney Cancer [Other] [OTHER]       uncle        Social History   Marital Status:   Spouse Name: N/A    Years of Education: N/A  Number of Children: N/A     Occupational History  None on file     Social History Main Topics   Smoking status: Hunter Alas Oral Cap DR Particles Take 1 capsule (60 mg total) by mouth daily.  Disp: 90 capsule Rfl: 3   SUMATRIPTAN SUCCINATE 6 MG/0.5ML Subcutaneous Solution Auto-injector INJECT 1 DOSE INTO SKIN DAILY AS NEEDED FOR HEADACHE Disp: 9 vial Rfl: 0   ClonazePAM 1 MG Ora suspect musculoskeletal  -add lidocaine patch      Low blood pressure; check orthostatics, IVF ordered    Preventative SCDs    Rickey Saldana MD  [facility]  [phone]

## 2017-04-29 NOTE — CONSULTS
[facility] [address] [place] [address] Cardiology  Report of Consultation      Patient Status:  Observation    1970 MRN [id number]   [facility] 7NE-A Attending Opal Lantigua MD   Hosp Day # 1 PCP Rachel Billings DO     Reason for Diagnosis Date   • Hypothyroidism    • Vitamin D deficiency      Severely Vitamin d deficient    • Cervical dysplasia Late summer 2011     THANH 1, on colpo late summer 2011, s/p hysterectomy 2003, cervix still intack    • Osteoarthrosis, unspecified wheth Surgeon: Katharina Brooks MD;  Location: [address] [place] [address]    CYST ASPIRATION LEFT      NEEDLE BIOPSY LEFT  2010    BENIGN BIOPSY LEFT  2010    Comment abcess    LEEP  10/14/15    Comment DDL/HIGH GRADE CERVICAL DYSPLASIA    LEEP  11/4/15    Comment Continuous Infusion:   • sodium chloride 100 mL/hr at 04/29/17 0851       PRN Medications:   LORazepam **OR** LORazepam, ClonazePAM, HYDROcodone-acetaminophen, Zolpidem Tartrate, acetaminophen, ondansetron HCl    Outpatient Medications:     Ina france unremarkable.     Physical Exam:    04/29/17  1431   BP: 116/63   Pulse: 91   Temp:    Resp: 18     Wt Readings from Last 3 Encounters:  04/28/17 : 170 lb (77.111 kg)  04/04/17 : 179 lb (81.194 kg)  03/16/17 : 182 lb (82.555 kg)          General: Well devel Tele:  SR  EKG: SR.  IRBBB        Assessment:  1. Unresponsive events   -History is not consistent with syncopal events   -Evaluation from neurologic perspective underway, with past Dx of pseudoseizure  2. Hypothyroid  3. Anemia  4.   H/O Fibromyalgia

## 2017-04-30 ENCOUNTER — APPOINTMENT (OUTPATIENT)
Dept: MRI IMAGING | Facility: HOSPITAL | Age: 47
End: 2017-04-30
Attending: Other
Payer: COMMERCIAL

## 2017-04-30 PROCEDURE — 70544 MR ANGIOGRAPHY HEAD W/O DYE: CPT

## 2017-04-30 PROCEDURE — 70553 MRI BRAIN STEM W/O & W/DYE: CPT

## 2017-04-30 PROCEDURE — 70549 MR ANGIOGRAPH NECK W/O&W/DYE: CPT

## 2017-04-30 RX ORDER — ERGOCALCIFEROL (VITAMIN D2) 10 MCG
400 TABLET ORAL DAILY
Status: DISCONTINUED | OUTPATIENT
Start: 2017-04-30 | End: 2017-05-01

## 2017-04-30 RX ORDER — POTASSIUM CHLORIDE 20 MEQ/1
40 TABLET, EXTENDED RELEASE ORAL ONCE
Status: COMPLETED | OUTPATIENT
Start: 2017-04-30 | End: 2017-04-30

## 2017-04-30 NOTE — PROCEDURES
[facility], [address] [place] [address]      PATIENT'S NAME: Lily Ware   ATTENDING PHYSICIAN: Kenisha Arguelles M.D.   REQUESTING PHYSICIAN:    PATIENT ACCOUNT #: [de-identified] LOCATION: [address] [place] [address] REC said, \"I am sorry. \"  During photic stimulation, the patient began having body shaking bilaterally, asymmetrically, will open eyes on command, gritted her teeth, and was able to say she is seeing the lights. Then, she stopped and became unresponsive.   Silvia Ceron is necessary.     Dictated By Damian Chavez M.D.  d: 04/30/2017 11:57:18  t: 04/30/2017 12:47:31  Job [id number]  JA/

## 2017-04-30 NOTE — PHYSICAL THERAPY NOTE
Orders received via fall risk protocol, chart reviewed. Spoke with RN, patient is not appropriate for PT evaluation at this time, as she is on a 24 hour EEG monitor, until late this afternoon. Plan to follow up with PT evaluation tomorrow, 5/1/17.

## 2017-04-30 NOTE — PLAN OF CARE
Assumed care at 1900. Pt a/ox4. Had 3 seizure episode all night lasted 15 mins each. Ativan iv prn given per order. 24hr eeg monitoring ongoing. Plan for mri after eeg completed.    NEUROLOGICAL - ADULT    • Absence of seizures Not Progressing          Impa

## 2017-04-30 NOTE — PROGRESS NOTES
[facility] hospitalist daily note  Seen/examined on 4/30/17    S; right sided chest pain better with lidocaine patch.  No SOB, no abd pain    Medications in EPIC    PE;   04/30/17  0924   BP: 110/56   Pulse: 85   Temp:    Resp: 21     Gen: awake, alert, no res

## 2017-04-30 NOTE — PLAN OF CARE
Patient experienced 6 witnessed seizures and 2 more the  reported. Responded to Ativan 1mg for treatment. Patient presented with first 2 seizures starting on the left side with shaking and unconscious, which moved to bilateral shaking.  Patient was t

## 2017-04-30 NOTE — SLP NOTE
ADULT SWALLOWING EVALUATION    ASSESSMENT & PLAN   ASSESSMENT  Patient admitted on 4/28/17 with Syncope and collapse, Hypokalemia. Per medical chart,  Psychogenic nonepileptic seizure  And presented for evaluation due to possible seizures.  CXR revealed no or localized, unspecified site    • Endometriosis    • Migraine    • Other and unspecified hyperlipidemia    • Hypercholesterolemia    • Kidney disease    • Migraines    • Psychogenic nonepileptic seizure    • Kidney stones    • Visual impairment      glas Swallow: Within Functional Limits                      Pharyngeal Phase of Swallow: Within Functional Limits           (Please note: Silent aspiration cannot be evaluated clinically.  Videofluoroscopic Swallow Study is required to rule-out silent aspiration

## 2017-04-30 NOTE — PLAN OF CARE
Pt assisted to bedside commode with RN and tech present. Asking about speech eval. Stating \"I am starving. \"     09:23 call light placed on by  who states he had arrived 1 minute prior and now patient is having an episode.  Tech alerted this RN who administered ativan. Immediately following flush, pt stopped shaking. 1700 Pt returned from MRI. As this RN was hooking back up heart monitor patient asks, [facility] aren't sending me home tonight are they? I want to talk to all my doctors.  I don't want t

## 2017-04-30 NOTE — PROGRESS NOTES
[facility] Cardiology  Progress Note    Arabellatommie Krystalgillian Patient Status:  Observation    1970 MRN [id number]   [facility] 7NE-A Attending Pk Tomas MD   Hosp Day # 2 PCP Garrett Redmond DO     Subjective:   Report kg)      Allergies:    Cashews                 Shortness of Breath  Penicillin G            Rash  Sulfa Antibiotics       Hives    Physical Exam:   General:  Well-developed / Well-nourished. No acute distress. HEENT:  Normocephalic. Atraumatic.   No icte cavity size was normal. Wall thickness was normal.     Systolic function was normal. The estimated ejection fraction was 55-60%.    There was no diagnostic evidence for regional wall motion abnormalities. 2. Right ventricle:  The cavity size was normal. W

## 2017-04-30 NOTE — PLAN OF CARE
12:32-12:47  at bedside, RN called in. Pt shaking all extremities. Vital signs stable. O2 applied for comfort. Pt hitting her chest. Pt turned to side.      11:40-11:55

## 2017-04-30 NOTE — PROGRESS NOTES
04/29/17 1243 04/29/17 1244 04/29/17 1249   Vital Signs   Pulse 78 81 80   Heart Rate Source Monitor Monitor Monitor   Cardiac Rhythm NSR NSR NSR   Resp 18 15 18   Respiratory Quality Normal Normal Normal   BP 98/58 mmHg 120/64 mmHg 118/69 mmHg   BP Loc

## 2017-04-30 NOTE — PROGRESS NOTES
dEy Sunshine is a 40year old female.    Patient presents with:  Seizure Disorder (neurologic)    HPI:    Neuro fu note    Pt with nonepileptic seizures  Had 24 Hr EEg (she has been monitored several times now) had 5-7 events with bilateral arm wri nontender     Skin no rashes    MENTAL STATUS/MOOD:     Orientation: Orientated to person, please and time.      Memory: Intacted     Attention / Concentration: Normal     Language: Normal     Agnosia/Apraxia:None     Fund of Knowledge:Normal     Mood and a

## 2017-05-01 VITALS
HEART RATE: 82 BPM | RESPIRATION RATE: 16 BRPM | SYSTOLIC BLOOD PRESSURE: 107 MMHG | DIASTOLIC BLOOD PRESSURE: 67 MMHG | TEMPERATURE: 98 F | OXYGEN SATURATION: 97 % | BODY MASS INDEX: 27.32 KG/M2 | HEIGHT: 66 IN | WEIGHT: 170 LBS

## 2017-05-01 PROCEDURE — 90792 PSYCH DIAG EVAL W/MED SRVCS: CPT | Performed by: OTHER

## 2017-05-01 RX ORDER — MELATONIN
325
Qty: 30 TABLET | Refills: 0 | Status: SHIPPED | OUTPATIENT
Start: 2017-05-01 | End: 2017-08-03

## 2017-05-01 RX ORDER — POTASSIUM CHLORIDE 20 MEQ/1
40 TABLET, EXTENDED RELEASE ORAL ONCE
Status: COMPLETED | OUTPATIENT
Start: 2017-05-01 | End: 2017-05-01

## 2017-05-01 RX ORDER — OMEGA-3S/DHA/EPA/FISH OIL/D3 300MG-1000
400 CAPSULE ORAL DAILY
Qty: 30 TABLET | Refills: 0 | Status: SHIPPED | OUTPATIENT
Start: 2017-05-01

## 2017-05-01 NOTE — SLP NOTE
SPEECH DAILY NOTE - INPATIENT    Evaluation Date: 05/01/2017    ASSESSMENT & PLAN   ASSESSMENT  Patient seen for follow-up visit to assess tolerance of recommended diet.  present.   Patient and  reported that breakfast was eaten without incid
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
## 2017-05-01 NOTE — DISCHARGE SUMMARY
[facility]  Discharge Summary    Coy Baum Patient Status:  Observation    1970 MRN [id number]   [facility] 7NE-A Attending Juliano Pablo MD   [facility] Day [facility] 3 PCP Harley Castillo DO     Date of Admission: 2017 consulted    Hospital Course:   41 yo female with many medical problems including Hypothyroidism, migraine, psychogenic nonepileptic seizure presented for evaluation due to loss of consciousness and possible seizures Neurology and cardiology consulted    L times daily. Refills: 1    HYDROcodone-acetaminophen 5-325 MG Oral Tab  Take 1 tablet by mouth every 6 (six) hours as needed.   Qty: 120 tablet Refills: 0    Levothyroxine Sodium 50 MCG Oral Tab  Take 50 mcg by mouth before breakfast.    topiramate 100 MG persistent dizziness or light-headedness  8. extreme fatigue  9. Numbness/tingling  10.  Difficulty urinating or defecating  11. bleeding    Do not drive (unless ok with Neurology)  Do not drive when taking pain medications or Marijuana  Do not exceed 4 gra
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
## 2017-05-01 NOTE — PROGRESS NOTES
1735: PT notified nurse that pt started \"seeing spots\" and needed to sit down. Then became unresponsive. RN at pt side. Pt came to after about 15 seconds and stated she was going to have a seizure.  Pt began having tremors and grimacing but able to follow

## 2017-05-01 NOTE — PLAN OF CARE
Assumed care at 1900. Pt a/ox4. Vss, nsr per tele, RA, . Norco prn given for c/o right rib pain w/ relief. No seizure episode all night. Call light in reach. Will continue to monitor. Dr. Kacie Roberts to see pt today.     4643: Pt assisted to bathroom per

## 2017-05-01 NOTE — PROGRESS NOTES
[facility] hospitalist daily note  Seen/examined on 5/1/17    S; no chest pain, no SOB, no abd pain, no nausea    Medications in EPIC    PE;   05/01/17  0840   BP: 108/57   Pulse: 81   Temp: 97.4 °F (36.3 °C)   Resp: 19     Gen: awake, alert, no respiratory d

## 2017-05-01 NOTE — PLAN OF CARE
Impaired Functional Mobility    • Achieve highest/safest level of mobility/gait Adequate for Discharge        MUSCULOSKELETAL - ADULT    • Return mobility to safest level of function Adequate for Discharge        NEUROLOGICAL - ADULT    • Achieves stable o

## 2017-05-01 NOTE — PROGRESS NOTES
[facility] Cardiology  Progress Note    Cindra Cost Patient Status:  Observation    1970 MRN [id number]   [facility] 7NE-A Attending Bridgette Samuel MD   Hosp Day # 3 PCP Bora Delong DO     Subjective:   Report Rash  Sulfa Antibiotics       Hives    Physical Exam:   General:  Well-developed / Well-nourished. No acute distress. HEENT:  Normocephalic. Atraumatic. No icterus. Neck:  There is no jugular venous distention.    Cardiovascular:  Cardiovascular examin The cavity size was normal. Wall thickness was normal.     Systolic function was normal. The estimated ejection fraction was 55-60%.    There was no diagnostic evidence for regional wall motion abnormalities. 2. Right ventricle:  The cavity size was garland

## 2017-05-01 NOTE — PHYSICAL THERAPY NOTE
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 Spouse recently purchased electric scooter due to increased episodes and concerns with safety in the household when spouse is working. SUBJECTIVE  \"I want my life back. \"    OBJECTIVE  Precautions:  (seizure (actual=supervision)  Distance (ft): 60  Assistive Device: None  Pattern: Within Functional Limits  Stoop/Curb Assistance: Not tested       Skilled Therapy Provided: Patient presents semi-reclined in bed.   Pt able to complete supine to sit transfer and sit discharged from Physical Therapy services. Please re-order if a new functional limitation presents during this admission. GOALS  Patient was able to achieve the following goals . ..     Patient was able to transfer At previous, functional level   Patient

## 2017-05-01 NOTE — PROGRESS NOTES
Clay Renee is a 40year old female.    Patient presents with:  Seizure Disorder (neurologic)    HPI:    Neuro fu note    Pt with nonepileptic seizures  Had 24 Hr EEg (she has been monitored several times now) had 5-7 events with bilateral arm wri URINE-COLOR      Yellow Yellow   APPEARANCE      Clear Hazy (A)   SPECIFIC GRAVITY      1.001-1.030 1.017   GLUCOSE (URINE DIPSTICK)      Negative mg/dl Negative   BILIRUBIN      Negative Negative   KETONES (URINE DIPSTICK)      Negative mg/dL criteria.      CONTRAST USED:  15 cc of paramagnetic gadolinium-based contrast was injected intravenously.      FINDINGS:     The ventricles and sulci are within normal limits.  Slight asymmetry of the lateral ventricles is considered a normal variant.  The unremarkable.      The common carotid arteries are widely patent.  The carotid bifurcations appear unremarkable.  There is no evidence of hemodynamically significant stenosis in the carotid bulbs by NASCET criteria.  The cervical internal carotid arteries (77.111 kg)  BMI 27.45 kg/m2  SpO2 98%    CONSTITUTIONAL/OTHER     Appearance: well nourished, well developed, no acute distress. Head-Spine: No tenderness.      BP: Normal     Pulse/Respiration: Normal     Carotid: No carotid bruits     Heart: RRR

## 2017-06-07 PROBLEM — F41.1 ANXIETY STATE: Status: ACTIVE | Noted: 2017-06-07

## 2017-08-21 PROCEDURE — 88175 CYTOPATH C/V AUTO FLUID REDO: CPT | Performed by: OBSTETRICS & GYNECOLOGY

## 2017-09-07 PROCEDURE — 88342 IMHCHEM/IMCYTCHM 1ST ANTB: CPT | Performed by: OBSTETRICS & GYNECOLOGY

## 2017-09-07 PROCEDURE — 88305 TISSUE EXAM BY PATHOLOGIST: CPT | Performed by: OBSTETRICS & GYNECOLOGY

## 2017-10-17 PROCEDURE — 86300 IMMUNOASSAY TUMOR CA 15-3: CPT | Performed by: INTERNAL MEDICINE

## 2017-10-17 PROCEDURE — 82378 CARCINOEMBRYONIC ANTIGEN: CPT | Performed by: INTERNAL MEDICINE

## 2017-11-13 PROCEDURE — 82607 VITAMIN B-12: CPT | Performed by: FAMILY MEDICINE

## 2017-12-01 ENCOUNTER — LAB ENCOUNTER (OUTPATIENT)
Dept: LAB | Age: 47
End: 2017-12-01
Attending: INTERNAL MEDICINE
Payer: COMMERCIAL

## 2017-12-01 DIAGNOSIS — E87.6 HYPOPOTASSEMIA: Primary | ICD-10-CM

## 2017-12-01 DIAGNOSIS — Z87.440 HISTORY OF URINARY TRACT INFECTION: ICD-10-CM

## 2017-12-01 DIAGNOSIS — N20.2 CALCULUS OF KIDNEY AND URETER: ICD-10-CM

## 2017-12-01 DIAGNOSIS — E87.6 HYPOKALEMIA: ICD-10-CM

## 2017-12-01 DIAGNOSIS — E55.9 AVITAMINOSIS D: ICD-10-CM

## 2017-12-01 DIAGNOSIS — G40.909 EPILEPSY (HCC): ICD-10-CM

## 2017-12-01 DIAGNOSIS — D50.9 IRON DEFICIENCY ANEMIA, UNSPECIFIED: ICD-10-CM

## 2017-12-01 PROCEDURE — 36415 COLL VENOUS BLD VENIPUNCTURE: CPT

## 2017-12-01 PROCEDURE — 80048 BASIC METABOLIC PNL TOTAL CA: CPT

## 2018-02-05 PROCEDURE — 82785 ASSAY OF IGE: CPT | Performed by: FAMILY MEDICINE

## 2018-02-05 PROCEDURE — 86376 MICROSOMAL ANTIBODY EACH: CPT | Performed by: FAMILY MEDICINE

## 2018-02-05 PROCEDURE — 86003 ALLG SPEC IGE CRUDE XTRC EA: CPT | Performed by: FAMILY MEDICINE

## 2018-03-26 PROCEDURE — 87086 URINE CULTURE/COLONY COUNT: CPT | Performed by: EMERGENCY MEDICINE

## 2018-06-06 PROBLEM — M21.619 BUNION OF GREAT TOE: Status: ACTIVE | Noted: 2018-06-06

## 2018-06-06 PROBLEM — M79.672 FOOT PAIN, BILATERAL: Status: ACTIVE | Noted: 2018-06-06

## 2018-06-06 PROBLEM — M20.11 HALLUX VALGUS, ACQUIRED, BILATERAL: Status: ACTIVE | Noted: 2018-06-06

## 2018-06-06 PROBLEM — M79.671 FOOT PAIN, BILATERAL: Status: ACTIVE | Noted: 2018-06-06

## 2018-06-06 PROBLEM — M20.12 HALLUX VALGUS, ACQUIRED, BILATERAL: Status: ACTIVE | Noted: 2018-06-06

## 2018-06-07 PROBLEM — M21.622 BUNIONETTE OF LEFT FOOT: Status: ACTIVE | Noted: 2018-06-07

## 2018-06-27 PROBLEM — Z98.890 S/P FOOT SURGERY, LEFT: Status: ACTIVE | Noted: 2018-06-27

## 2018-08-01 PROCEDURE — 88175 CYTOPATH C/V AUTO FLUID REDO: CPT | Performed by: OBSTETRICS & GYNECOLOGY

## 2018-08-01 PROCEDURE — 88305 TISSUE EXAM BY PATHOLOGIST: CPT | Performed by: OBSTETRICS & GYNECOLOGY

## 2018-08-22 PROBLEM — M79.674 CHRONIC PAIN OF TOE OF RIGHT FOOT: Status: ACTIVE | Noted: 2018-08-22

## 2018-08-22 PROBLEM — M20.11 HALLUX ABDUCTO VALGUS, RIGHT: Status: ACTIVE | Noted: 2018-08-22

## 2018-08-22 PROBLEM — G89.29 CHRONIC PAIN OF TOE OF RIGHT FOOT: Status: ACTIVE | Noted: 2018-08-22

## 2018-08-22 PROBLEM — M79.672 PAIN IN LEFT FOOT: Status: ACTIVE | Noted: 2018-08-22

## 2019-06-11 PROBLEM — N93.9 VAGINAL BLEEDING: Status: ACTIVE | Noted: 2019-06-11

## 2019-06-11 PROBLEM — N95.2 VAGINAL ATROPHY: Status: ACTIVE | Noted: 2019-06-11

## 2019-06-11 PROCEDURE — 88175 CYTOPATH C/V AUTO FLUID REDO: CPT | Performed by: OBSTETRICS & GYNECOLOGY

## 2019-06-27 PROBLEM — R26.89 BALANCE PROBLEM: Status: ACTIVE | Noted: 2019-06-27

## 2019-06-27 PROBLEM — R26.81 UNSTEADY GAIT: Status: ACTIVE | Noted: 2019-06-27

## 2023-04-28 NOTE — ED AVS SNAPSHOT
[facility] Emergency Department    [place] [address]    Phone:  [phone]    Fax:  [phone]           Ai Staff   MRN: [id number]    Department:  [facility] Emergency Department   Date of Visit: Bring a paper prescription for each of these medications    - Ciprofloxacin HCl 500 MG Tabs            Discharge References/Attachments     BLADDER INFECTION, FEMALE (ADULT) (ENGLISH)      Disclosure     Insurance plans vary and the physician(s) referred CARE PHYSICIAN AT ONCE OR RETURN IMMEDIATELY TO THE EMERGENCY DEPARTMENT.     If you have been prescribed any medication(s), please fill your prescription right away and begin taking the medication(s) as directed    If the emergency physician has read X-ray coverage. Patient [address] is a Federal Navigator program that can help with your Affordable Care Act coverage, as well as all types of Medicaid plans.   To get signed up and covered, please call [phone] and ask to get set up for an insuran No

## (undated) DEVICE — 40580 - THE PINK PAD - ADVANCED TRENDELENBURG POSITIONING KIT: Brand: 40580 - THE PINK PAD - ADVANCED TRENDELENBURG POSITIONING KIT

## (undated) DEVICE — Device

## (undated) DEVICE — ELECTRO LUBE IS A SINGLE PATIENT USE DEVICE THAT IS INTENDED TO BE USED ON ELECTROSURGICAL ELECTRODES TO REDUCE STICKING.: Brand: KEY SURGICAL ELECTRO LUBE

## (undated) DEVICE — PREMIUM WET SKIN PREP TRAY: Brand: MEDLINE INDUSTRIES, INC.

## (undated) DEVICE — SOL H2O 1000ML BTL

## (undated) DEVICE — KENDALL SCD EXPRESS SLEEVES, KNEE LENGTH, MEDIUM: Brand: KENDALL SCD

## (undated) DEVICE — SUTURE VLOC 180 0 12\" 0316

## (undated) DEVICE — PROGRASP FORCEPS: Brand: ENDOWRIST;DAVINCI SI

## (undated) DEVICE — ENDOPATH XCEL WITH OPTIVIEW TECHNOLOGY BLADELESS TROCARS WITH STABILITY SLEEVES: Brand: ENDOPATH XCEL OPTIVIEW

## (undated) DEVICE — ABSORBABLE HEMOSTAT (OXIDIZED REGENERATED CELLULOSE, U.S.P.): Brand: SURGICEL

## (undated) DEVICE — SUTURE VICRYL 0 UR-6

## (undated) DEVICE — FENESTRATED BIPOLAR FORCEPS: Brand: ENDOWRIST;DAVINCI SI

## (undated) DEVICE — GLOVE SURG TRIUMPH SZ 6-1/2

## (undated) DEVICE — ENDOPATH XCEL UNIVERSAL TROCAR STABLILITY SLEEVES: Brand: ENDOPATH XCEL

## (undated) DEVICE — GYN LAP/ROBOTIC: Brand: MEDLINE INDUSTRIES, INC.

## (undated) DEVICE — TIP COVER ACCESSORY

## (undated) DEVICE — BIPOLAR FORCEPS CORD,BANANA LEADS: Brand: VALLEYLAB

## (undated) DEVICE — GLOVE BIOGEL M SURG SZ 71/2

## (undated) DEVICE — DV OBTURATOR BLADELESS 8MM

## (undated) DEVICE — VIOLET BRAIDED (POLYGLACTIN 910), SYNTHETIC ABSORBABLE SUTURE: Brand: COATED VICRYL

## (undated) DEVICE — PAD ULNAR NERVE PROTECTOR

## (undated) DEVICE — SPECIMEN TRAP LUKI

## (undated) DEVICE — PAD SACRAL SPAN AID

## (undated) DEVICE — DERMABOND LIQUID ADHESIVE

## (undated) DEVICE — UNDYED BRAIDED (POLYGLACTIN 910), SYNTHETIC ABSORBABLE SUTURE: Brand: COATED VICRYL

## (undated) DEVICE — #15 STERILE STAINLESS BLADE: Brand: STERILE STAINLESS BLADES

## (undated) DEVICE — MONOPOLAR CURVED SCISSORS: Brand: ENDOWRIST;DAVINCI SI

## (undated) DEVICE — TROCAR BLADELESS 12MM B12LT

## (undated) NOTE — IP AVS SNAPSHOT
[facility] [place] [address] [place], [address] ~ [phone]                Discharge Summary   4/4/2017    Aria Salinas           Admission Information        Provider Department    4/4/2017 Soham Machado MD  3nw-A DULoxetine HCl 60 MG Cpep   Last time this was given:  60 mg on 4/5/2017  8:27 AM   Commonly known as:  CYMBALTA        Take 1 capsule (60 mg total) by mouth daily.     Therisa Fahad                           Levothyroxine Sodium 50 MCG Tabs   Last vicky [phone]        Immunization History as of 4/5/2017  Never Reviewed    No immunizations on file.       Recent Hematology Lab Results  (Last 3 results in the past 90 days)    WBC RBC Hemoglobin Hematocrit MCV MCH MCHC RDW Platelet MPV    (84/50/51)  9.3 - If you have concerns related to behavioral health issues or thoughts of harming yourself, contact [address] [place] [address] at [phone].     - If you don’t have insurance, Moises Pacheco has partnered with Patient [employer] Katherin Use: Lower cholesterol, protect your heart   Most common side effects: Dizziness, constipation, abnormal liver function   What to report to your healthcare team:  Dizziness, muscle aches, constipation           Narcotic Medications     HYDROcodone-acetami What to report to your healthcare team: Dizziness, Somnolence, Weakness, Headache, Nausea/vomiting           Mood and Thought Medications     DULoxetine HCl (CYMBALTA) 60 MG Oral Cap DR Particles       Use: Treat conditions such as depression and thought d

## (undated) NOTE — ED AVS SNAPSHOT
[facility] Emergency Department    [place] [facility]  [address] [address]    Phone:  [phone]    Fax:  [phone]           Michaela Quintero   MRN: [id number]    Department:  [facility] Emergency Department   Date of Visit: IF THERE IS ANY CHANGE OR WORSENING OF YOUR CONDITION, CALL YOUR PRIMARY CARE PHYSICIAN AT ONCE OR RETURN IMMEDIATELY TO THE EMERGENCY DEPARTMENT.     If you have been prescribed any medication(s), please fill your prescription right away and begin taking t

## (undated) NOTE — IP AVS SNAPSHOT
[facility] [place]  [address] [place], [address] ~ [phone]                Discharge Summary   4/28/2017    Jono Wiggins           Admission Information        Provider Department    4/28/2017 Estrellita Reyes MD  7ne-A Commonly known as:  CYMBALTA   Next dose due:  5/2/17 9 AM        Take 1 capsule (60 mg total) by mouth daily.     Nicky Warren                           HYDROcodone-acetaminophen 5-325 MG Tabs   Last time this was given:  1 tablet on 4/30/2017  8:53 P Zolpidem Tartrate ER 12.5 MG Tbcr   Commonly known as:  LIS CR        Take 12.5 mg by mouth nightly as needed for Sleep.                               STOP taking these medications     ClonazePAM 0.5 MG Tbdp           ClonazePAM 1 MG Tabs   Commonly kno [address] [facility]  [phone]          Follow up with Lei Vela MD.    Specialty:  NEUROLOGY    Contact information:    [address]  [address] [place] [address]  [phone]          Follow up with Eduardo Mireles DO. 10 (05/01/17)  0.89 (05/01/17)  8.9 (04/29/17)  67 (04/29/17)  11 (L)      Metabolic Lab Results  (Last result in the past 90 days)    ALT Bilirubin,Total Total Protein Albumin Sodium Potassium Chloride    (04/29/17)  23 (04/29/17)  0.6 (04/29/17)  6.4 (04 For medical emergencies, dial 911.             _____________________________________________________________________________    Medication Side Effects - Medications to be taken at home  As your caregivers, we want you to be aware of the medications you a Use:  Nausea/vomiting, acid reflux, low bowel motility, stomach pain   Most common side effects:  Depends on the specific medication but generally include: diarrhea, constipation, headache, allergic reaction (itching, rash, hives)   What to report to your